# Patient Record
Sex: MALE | Race: WHITE | NOT HISPANIC OR LATINO | URBAN - METROPOLITAN AREA
[De-identification: names, ages, dates, MRNs, and addresses within clinical notes are randomized per-mention and may not be internally consistent; named-entity substitution may affect disease eponyms.]

---

## 2018-12-10 ENCOUNTER — HOSPITAL ENCOUNTER (OUTPATIENT)
Facility: HOSPITAL | Age: 53
Setting detail: OBSERVATION
Discharge: HOME | End: 2018-12-11
Attending: EMERGENCY MEDICINE | Admitting: HOSPITALIST
Payer: COMMERCIAL

## 2018-12-10 ENCOUNTER — APPOINTMENT (EMERGENCY)
Dept: RADIOLOGY | Facility: HOSPITAL | Age: 53
End: 2018-12-10
Payer: COMMERCIAL

## 2018-12-10 DIAGNOSIS — R06.09 DYSPNEA ON EXERTION: ICD-10-CM

## 2018-12-10 DIAGNOSIS — I25.10 CORONARY ARTERY DISEASE INVOLVING NATIVE CORONARY ARTERY OF NATIVE HEART, ANGINA PRESENCE UNSPECIFIED: ICD-10-CM

## 2018-12-10 DIAGNOSIS — R06.02 SHORTNESS OF BREATH: Primary | ICD-10-CM

## 2018-12-10 DIAGNOSIS — I20.0 UNSTABLE ANGINA (CMS/HCC): ICD-10-CM

## 2018-12-10 PROBLEM — F25.9 SCHIZOAFFECTIVE DISORDER (CMS/HCC): Status: ACTIVE | Noted: 2018-12-10

## 2018-12-10 PROBLEM — R07.9 CHEST PAIN: Status: ACTIVE | Noted: 2018-12-10

## 2018-12-10 PROBLEM — E78.5 HYPERLIPIDEMIA: Status: ACTIVE | Noted: 2018-12-10

## 2018-12-10 PROBLEM — G40.909 SEIZURE DISORDER (CMS/HCC): Status: ACTIVE | Noted: 2018-12-10

## 2018-12-10 PROBLEM — R06.00 DYSPNEA: Status: ACTIVE | Noted: 2018-12-10

## 2018-12-10 PROBLEM — I10 BENIGN HYPERTENSION: Status: ACTIVE | Noted: 2018-12-10

## 2018-12-10 LAB
ANION GAP SERPL CALC-SCNC: 8 MEQ/L (ref 3–15)
ATRIAL RATE: 88
BASOPHILS # BLD: 0.02 K/UL (ref 0.01–0.1)
BASOPHILS NFR BLD: 0.3 %
BUN SERPL-MCNC: 14 MG/DL (ref 8–20)
CALCIUM SERPL-MCNC: 8.9 MG/DL (ref 8.9–10.3)
CHLORIDE SERPL-SCNC: 107 MEQ/L (ref 98–109)
CO2 SERPL-SCNC: 24 MEQ/L (ref 22–32)
CREAT SERPL-MCNC: 0.8 MG/DL
D DIMER PPP IA.FEU-MCNC: 0.33 UG/ML FEU (ref 0–0.5)
DIFFERENTIAL METHOD BLD: ABNORMAL
EOSINOPHIL # BLD: 0 K/UL (ref 0.04–0.54)
EOSINOPHIL NFR BLD: 0 %
ERYTHROCYTE [DISTWIDTH] IN BLOOD BY AUTOMATED COUNT: 12.3 % (ref 11.6–14.4)
GFR SERPL CREATININE-BSD FRML MDRD: >60 ML/MIN/1.73M*2
GLUCOSE SERPL-MCNC: 103 MG/DL (ref 70–99)
HCT VFR BLDCO AUTO: 47.9 %
HGB BLD-MCNC: 15.9 G/DL
IMM GRANULOCYTES # BLD AUTO: 0.02 K/UL (ref 0–0.08)
IMM GRANULOCYTES NFR BLD AUTO: 0.3 %
LYMPHOCYTES # BLD: 2.49 K/UL (ref 1.2–3.5)
LYMPHOCYTES NFR BLD: 36.4 %
MCH RBC QN AUTO: 28.9 PG (ref 28–33.2)
MCHC RBC AUTO-ENTMCNC: 33.2 G/DL (ref 32.2–36.5)
MCV RBC AUTO: 86.9 FL (ref 83–98)
MONOCYTES # BLD: 0.47 K/UL (ref 0.3–1)
MONOCYTES NFR BLD: 6.9 %
NEUTROPHILS # BLD: 3.84 K/UL (ref 1.7–7)
NEUTS SEG NFR BLD: 56.1 %
NRBC BLD-RTO: 0 %
P AXIS: 35
PDW BLD AUTO: 9.5 FL (ref 9.4–12.4)
PLATELET # BLD AUTO: 223 K/UL
POTASSIUM SERPL-SCNC: 4 MEQ/L (ref 3.6–5.1)
PR INTERVAL: 154
QRS DURATION: 98
QT INTERVAL: 354
QTC CALCULATION(BAZETT): 428
R AXIS: 1
RBC # BLD AUTO: 5.51 M/UL (ref 4.5–5.8)
SODIUM SERPL-SCNC: 139 MEQ/L (ref 136–144)
T WAVE AXIS: 9
TROPONIN I SERPL-MCNC: <0.03 NG/ML
TROPONIN I SERPL-MCNC: <0.03 NG/ML
VENTRICULAR RATE: 88
WBC # BLD AUTO: 6.84 K/UL

## 2018-12-10 PROCEDURE — 99285 EMERGENCY DEPT VISIT HI MDM: CPT | Mod: 25

## 2018-12-10 PROCEDURE — 99220 PR INITIAL OBSERVATION CARE/DAY 70 MINUTES: CPT | Performed by: HOSPITALIST

## 2018-12-10 PROCEDURE — 80048 BASIC METABOLIC PNL TOTAL CA: CPT | Performed by: EMERGENCY MEDICINE

## 2018-12-10 PROCEDURE — 84484 ASSAY OF TROPONIN QUANT: CPT

## 2018-12-10 PROCEDURE — 84484 ASSAY OF TROPONIN QUANT: CPT | Mod: 91 | Performed by: HOSPITALIST

## 2018-12-10 PROCEDURE — G0378 HOSPITAL OBSERVATION PER HR: HCPCS

## 2018-12-10 PROCEDURE — 85379 FIBRIN DEGRADATION QUANT: CPT | Performed by: HOSPITALIST

## 2018-12-10 PROCEDURE — 36415 COLL VENOUS BLD VENIPUNCTURE: CPT

## 2018-12-10 PROCEDURE — 80048 BASIC METABOLIC PNL TOTAL CA: CPT

## 2018-12-10 PROCEDURE — 93005 ELECTROCARDIOGRAM TRACING: CPT | Performed by: HOSPITALIST

## 2018-12-10 PROCEDURE — 93005 ELECTROCARDIOGRAM TRACING: CPT | Performed by: EMERGENCY MEDICINE

## 2018-12-10 PROCEDURE — 85025 COMPLETE CBC W/AUTO DIFF WBC: CPT

## 2018-12-10 PROCEDURE — 63700000 HC SELF-ADMINISTRABLE DRUG: Performed by: HOSPITALIST

## 2018-12-10 PROCEDURE — 93005 ELECTROCARDIOGRAM TRACING: CPT

## 2018-12-10 PROCEDURE — 84484 ASSAY OF TROPONIN QUANT: CPT | Performed by: EMERGENCY MEDICINE

## 2018-12-10 PROCEDURE — 85025 COMPLETE CBC W/AUTO DIFF WBC: CPT | Performed by: EMERGENCY MEDICINE

## 2018-12-10 PROCEDURE — 71046 X-RAY EXAM CHEST 2 VIEWS: CPT

## 2018-12-10 RX ORDER — DEXTROSE 40 %
15-30 GEL (GRAM) ORAL AS NEEDED
Status: DISCONTINUED | OUTPATIENT
Start: 2018-12-10 | End: 2018-12-11 | Stop reason: HOSPADM

## 2018-12-10 RX ORDER — ROSUVASTATIN CALCIUM 10 MG/1
10 TABLET, COATED ORAL DAILY
Status: DISCONTINUED | OUTPATIENT
Start: 2018-12-10 | End: 2018-12-11 | Stop reason: HOSPADM

## 2018-12-10 RX ORDER — OMEPRAZOLE 20 MG/1
20 CAPSULE, DELAYED RELEASE ORAL DAILY
COMMUNITY

## 2018-12-10 RX ORDER — ASPIRIN 81 MG/1
81 TABLET ORAL DAILY
COMMUNITY

## 2018-12-10 RX ORDER — ASPIRIN 325 MG/1
81 TABLET, FILM COATED ORAL DAILY
COMMUNITY
End: 2018-12-10 | Stop reason: ENTERED-IN-ERROR

## 2018-12-10 RX ORDER — LAMOTRIGINE 200 MG/1
TABLET ORAL
Refills: 0 | COMMUNITY
Start: 2018-10-23

## 2018-12-10 RX ORDER — CLONAZEPAM 1 MG/1
TABLET ORAL
Refills: 2 | COMMUNITY
Start: 2018-11-29

## 2018-12-10 RX ORDER — IBUPROFEN 200 MG
16-32 TABLET ORAL AS NEEDED
Status: DISCONTINUED | OUTPATIENT
Start: 2018-12-10 | End: 2018-12-11 | Stop reason: HOSPADM

## 2018-12-10 RX ORDER — DEXTROSE 50 % IN WATER (D50W) INTRAVENOUS SYRINGE
25 AS NEEDED
Status: DISCONTINUED | OUTPATIENT
Start: 2018-12-10 | End: 2018-12-11 | Stop reason: HOSPADM

## 2018-12-10 RX ORDER — METOPROLOL TARTRATE 25 MG/1
25 TABLET, FILM COATED ORAL 2 TIMES DAILY
COMMUNITY
End: 2018-12-10 | Stop reason: ENTERED-IN-ERROR

## 2018-12-10 RX ORDER — CLOZAPINE 200 MG/1
TABLET ORAL
Refills: 11 | COMMUNITY
Start: 2018-11-30

## 2018-12-10 RX ORDER — ASPIRIN 81 MG/1
81 TABLET ORAL DAILY
Status: DISCONTINUED | OUTPATIENT
Start: 2018-12-10 | End: 2018-12-11 | Stop reason: HOSPADM

## 2018-12-10 RX ORDER — POTASSIUM CHLORIDE 14.9 MG/ML
20 INJECTION INTRAVENOUS AS NEEDED
Status: DISCONTINUED | OUTPATIENT
Start: 2018-12-10 | End: 2018-12-11 | Stop reason: HOSPADM

## 2018-12-10 RX ORDER — CLONAZEPAM 1 MG/1
1 TABLET ORAL 2 TIMES DAILY PRN
Status: DISCONTINUED | OUTPATIENT
Start: 2018-12-10 | End: 2018-12-11 | Stop reason: HOSPADM

## 2018-12-10 RX ORDER — ACETAMINOPHEN 325 MG/1
650 TABLET ORAL EVERY 6 HOURS PRN
Status: DISCONTINUED | OUTPATIENT
Start: 2018-12-10 | End: 2018-12-11 | Stop reason: HOSPADM

## 2018-12-10 RX ORDER — LAMOTRIGINE 200 MG/1
200 TABLET ORAL 2 TIMES DAILY
Status: DISCONTINUED | OUTPATIENT
Start: 2018-12-10 | End: 2018-12-11 | Stop reason: HOSPADM

## 2018-12-10 RX ORDER — AMLODIPINE BESYLATE 2.5 MG/1
TABLET ORAL
COMMUNITY
Start: 2018-09-17

## 2018-12-10 RX ORDER — ROSUVASTATIN CALCIUM 10 MG/1
10 TABLET, COATED ORAL DAILY
COMMUNITY

## 2018-12-10 RX ORDER — CLOZAPINE 100 MG/1
200 TABLET ORAL 2 TIMES DAILY
Status: DISCONTINUED | OUTPATIENT
Start: 2018-12-10 | End: 2018-12-11 | Stop reason: HOSPADM

## 2018-12-10 RX ORDER — METOPROLOL SUCCINATE 25 MG/1
25 TABLET, EXTENDED RELEASE ORAL EVERY EVENING
COMMUNITY

## 2018-12-10 RX ORDER — METOPROLOL SUCCINATE 25 MG/1
25 TABLET, EXTENDED RELEASE ORAL EVERY EVENING
Status: DISCONTINUED | OUTPATIENT
Start: 2018-12-10 | End: 2018-12-11 | Stop reason: HOSPADM

## 2018-12-10 RX ORDER — AMLODIPINE BESYLATE 2.5 MG/1
2.5 TABLET ORAL DAILY
Status: DISCONTINUED | OUTPATIENT
Start: 2018-12-10 | End: 2018-12-11 | Stop reason: HOSPADM

## 2018-12-10 RX ORDER — PANTOPRAZOLE SODIUM 40 MG/1
40 TABLET, DELAYED RELEASE ORAL DAILY
Status: DISCONTINUED | OUTPATIENT
Start: 2018-12-10 | End: 2018-12-11 | Stop reason: HOSPADM

## 2018-12-10 RX ADMIN — LAMOTRIGINE 200 MG: 200 TABLET ORAL at 22:06

## 2018-12-10 RX ADMIN — METOPROLOL SUCCINATE 25 MG: 25 TABLET, EXTENDED RELEASE ORAL at 22:05

## 2018-12-10 RX ADMIN — TICAGRELOR 90 MG: 90 TABLET ORAL at 22:06

## 2018-12-10 RX ADMIN — PANTOPRAZOLE SODIUM 40 MG: 40 TABLET, DELAYED RELEASE ORAL at 22:05

## 2018-12-10 RX ADMIN — ACETAMINOPHEN 650 MG: 325 TABLET ORAL at 22:06

## 2018-12-10 RX ADMIN — ASPIRIN 81 MG: 81 TABLET, COATED ORAL at 22:05

## 2018-12-10 RX ADMIN — CLOZAPINE 200 MG: 100 TABLET ORAL at 22:06

## 2018-12-10 RX ADMIN — AMLODIPINE BESYLATE 2.5 MG: 2.5 TABLET ORAL at 22:06

## 2018-12-10 RX ADMIN — ROSUVASTATIN CALCIUM 10 MG: 10 TABLET, FILM COATED ORAL at 22:05

## 2018-12-10 ASSESSMENT — COGNITIVE AND FUNCTIONAL STATUS - GENERAL
STANDING UP FROM CHAIR USING ARMS: 4 - NONE
HELP NEEDED FOR BATHING: 4 - NONE
DRESSING REGULAR UPPER BODY CLOTHING: 4 - NONE
EATING MEALS: 4 - NONE
DRESSING REGULAR LOWER BODY CLOTHING: 4 - NONE
WALKING IN HOSPITAL ROOM: 4 - NONE
CLIMB 3 TO 5 STEPS WITH RAILING: 4 - NONE
TOILETING: 4 - NONE
MOVING TO AND FROM BED TO CHAIR: 4 - NONE
HELP NEEDED FOR PERSONAL GROOMING: 4 - NONE

## 2018-12-10 ASSESSMENT — ENCOUNTER SYMPTOMS
ABDOMINAL PAIN: 0
DIAPHORESIS: 0
TREMORS: 1
SYNCOPE: 0
NAUSEA: 0
WEAKNESS: 0
CHILLS: 0
HEADACHES: 0
VOMITING: 1
CHEST TIGHTNESS: 0
WHEEZING: 1
FEVER: 0
DIARRHEA: 0
SHORTNESS OF BREATH: 1

## 2018-12-10 NOTE — ED PROVIDER NOTES
HPI     Chief Complaint   Patient presents with   • Shortness of Breath       54y/o M PMHx of HTN, hyperlipidemia, GERD, depression, anxiety, schizoaffective d/o and past cardiac cath with 1 stent 8/25/17, states that he has been experiencing increasing SOB x2weeks. The patient states that he has been having associated wheezing upon exertion and associated chest pain. The patient states that his SOB improves with sitting down and is worse when taking a deep breath. The patient also states that the chest pain radiates to his left scapula but is not similar to his past chest pain experiences. Patient states that he did feel as if he were having tremors when he was experiencing his SOB and also states that he has been having increasing anxiety because of his job which he hates. Patient also states that he has several glasses of wine per night but denies any other drug use or smoking. He denies taking any medications for his SOB or chest pain.  Patient denies any N/V/D, fever, HA, chest palpitations, dizziness, lightheadedness, sweating, presyncope or syncope.         Shortness of Breath   Severity:  Moderate  Onset quality:  Gradual  Duration:  2 weeks  Timing:  Intermittent  Progression:  Worsening  Chronicity:  Recurrent  Context: activity    Relieved by:  Rest  Worsened by:  Deep breathing, exertion, activity, stress and emotional stress  Ineffective treatments:  None tried  Associated symptoms: chest pain, vomiting (States he vomited x1 several days ago due to eating hot wings) and wheezing    Associated symptoms: no abdominal pain, no diaphoresis, no fever, no headaches and no syncope         Patient History     Past Medical History:   Diagnosis Date   • Depression    • GERD (gastroesophageal reflux disease)    • Hypertension    • Lipid disorder        History reviewed. No pertinent surgical history.    History reviewed. No pertinent family history.    Social History   Substance Use Topics   • Smoking status:  "Former Smoker     Quit date: 12/3/2009   • Smokeless tobacco: Never Used   • Alcohol use No       Systems Reviewed from Nursing Triage:          Review of Systems     Review of Systems   Constitutional: Negative for chills, diaphoresis and fever.   HENT: Negative for congestion.    Respiratory: Positive for shortness of breath and wheezing. Negative for chest tightness.    Cardiovascular: Positive for chest pain. Negative for syncope.   Gastrointestinal: Positive for vomiting (States he vomited x1 several days ago due to eating hot wings). Negative for abdominal pain, diarrhea and nausea.   Neurological: Positive for tremors. Negative for syncope, weakness and headaches.        Physical Exam     ED Triage Vitals [12/10/18 1430]   Temp Heart Rate Resp BP SpO2   36.3 °C (97.3 °F) 91 16 (!) 160/95 96 %      Temp Source Heart Rate Source Patient Position BP Location FiO2 (%) (Set)   Oral Monitor Sitting -- --                     Patient Vitals for the past 24 hrs:   BP Temp Temp src Pulse Resp SpO2 Height Weight   12/10/18 1430 (!) 160/95 36.3 °C (97.3 °F) Oral 91 16 96 % 1.651 m (5' 5\") 86.2 kg (190 lb)           Physical Exam   Constitutional: He is oriented to person, place, and time. He appears well-developed and well-nourished.   HENT:   Head: Normocephalic and atraumatic.   Eyes: Conjunctivae and EOM are normal. Pupils are equal, round, and reactive to light.   Neck: Normal range of motion. Neck supple.   Cardiovascular: Normal rate, regular rhythm, S1 normal, S2 normal, normal heart sounds and intact distal pulses.    No lifts, thrills or heaves appreciated.    Pulmonary/Chest: Effort normal and breath sounds normal. He has no wheezes. He has no rales. He exhibits no tenderness.   Lungs are clear bilaterally with no wheezes, rhonchi, or rales. Chest pain is non-reproducible.    Abdominal: Soft. Bowel sounds are normal. He exhibits no distension and no mass. There is no tenderness. There is no guarding. "   Neurological: He is alert and oriented to person, place, and time.   Skin: Skin is warm and dry. No erythema.   Nursing note and vitals reviewed.           Procedures    ED Course & MDM     Labs Reviewed   CBC AND DIFFERENTIAL    Narrative:     The following orders were created for panel order CBC and differential.  Procedure                               Abnormality         Status                     ---------                               -----------         ------                     CBC[[id number]]                                                                          Diff Count[[id number]]                                                                     Please view results for these tests on the individual orders.   BASIC METABOLIC PANEL   TROPONIN I   CBC   DIFF COUNT       X-RAY CHEST 2 VIEWS   Final Result   IMPRESSION: No active disease in the chest.      COMMENT: The current two view examination of the chest was compared to that   dated 9/7/2017.      The lungs are free of active infiltration and congestion.  No volume loss or   pleural effusions are seen.  The cardiomediastinal silhouette is within normal   limits of size and configuration.      The bony thorax is intact.            ECG 12 lead    (Results Pending)               [facility]         ED Course as of Dec 10 1729   Mon Dec 10, 2018   1521 CXR reviewed, no active dz in the chest  [GM]   1537 Peak flow performed =450  [GM]   1640 HEART Score for Major Cardiac Events from [email or web address]  on 12/10/2018  ** All calculations should be rechecked by clinician prior to use **    RESULT SUMMARY:  4 points  Moderate Score (4-6 points)    Risk of MACE of 12-16.6%.      INPUTS:  History --> 1 = Moderately suspicious  EKG --> 0 = Normal  Age --> 1 = 45-64  Risk factors --> 2 = =3 risk factors or history of atherosclerotic disease  Initial troponin --> 0 = =normal limit    [GM]   1650 DW Dr Lema  for hospitalization  [DP]   1655 Discussed course of action with  patient, he agrees   [GM]      ED Course User Index  [DP] Sheldon Reilly MD  [GM] Ayden Velásquez PA C         Clinical Impressions as of Dec 10 1729   Shortness of breath   Unstable angina (CMS/[facility]) ([facility]) - r/o        Ayden Velásquez PA C  12/10/18 2584

## 2018-12-10 NOTE — H&P
"   Hospital Medicine Service -  History & Physical        CHIEF COMPLAINT   Dyspnea     HISTORY OF PRESENT ILLNESS      Jhon Schroeder is a 53 y.o. male with a past medical history of CAD s/p stent to posterolateral branch of RCA in 8/17 (with patent stent on [facility] 9/17), HTN, HLD, seizures, schizoaffective disorder who presents with dyspnea and chest pain.  He states that for the past two weeks, he's noted dyspnea.  Feels like he is SOB to where he has to work to breathe.  He notes \"heavy wheezing\" with some of these episodes.  They occur with exertion (almost always when he walks up the steps) but also can occur while he's lying in bed at night.  He notices some chest pressure associated with the dyspnea on occasion.  Some chest \"constriction\" when taking a deep breath as well.  No orthopnea or PND.  No LE edema.  No cough or fever.  Had URI a few weeks ago.  He went to see his primary care physician today who told him his lungs sounded clear and advised him to come to the ER.  He has not missed doses of his DAPT and is compliant with his other medications.    Prior cardiologic testing:   Exercise nuclear stress 2014, did not achieve target HR, no ischemic findings.   [facility] 8/17:  RPL BRYCE placed   [facility] 9/17:  Patent RPL stent, possible microvascular disease with mild slow flow in RCA.   TTE 8/17:  EF 60-65% without RWMA.      PAST MEDICAL AND SURGICAL HISTORY      Past Medical History:   Diagnosis Date   • CAD (coronary artery disease)    • Depression    • GERD (gastroesophageal reflux disease)    • Hypertension    • Lipid disorder    • Schizoaffective disorder (CMS/HCC) ([facility])    • Seizures (CMS/HCC) ([facility])        History reviewed. No pertinent surgical history.    PCP: Madelaine Leahy MD    MEDICATIONS      Prior to Admission medications    Medication Sig Start Date End Date Taking? Authorizing Provider   amLODIPine (NORVASC) 2.5 mg tablet  9/17/18  Yes Provider, MD Pau   aspirin 81 mg enteric coated tablet " Take 81 mg by mouth daily.   Yes Pau Banuelos MD   metoprolol succinate XL (TOPROL-XL) 25 mg 24 hr tablet Take 25 mg by mouth every evening.   Yes Pau Banuelos MD   ticagrelor (BRILINTA) 90 mg tablet  9/24/18  Yes Pau Banuelos MD   clonazePAM (klonoPIN) 1 mg tablet TK 1 T PO BID PRN 11/29/18   Pau Banuelos MD   clozapine (CLOZARIL) 200 mg tablet TK 1 T PO BID 11/30/18   Pau Banuelos MD   lamoTRIgine (LaMICtal) 200 mg tablet TK 1 T PO BID 10/23/18   Pau Banuelos MD   omeprazole (PriLOSEC) 20 mg capsule Take 20 mg by mouth daily.    Pau Banuelos MD   rosuvastatin (CRESTOR) 10 mg tablet Take 10 mg by mouth daily.    Pau Banuelos MD   buffered aspirin (BUFFERIN) 325 mg tablet Take 81 mg by mouth daily.  12/10/18  Pau Banuelos MD   metoprolol tartrate (LOPRESSOR) 25 mg tablet Take 25 mg by mouth 2 times daily.  12/10/18  Pau Banuelos MD       ALLERGIES      No known allergies    FAMILY HISTORY      Family History   Problem Relation Age of Onset   • Heart disease Father        SOCIAL HISTORY      Social History     Social History   • Marital status: Single     Spouse name: N/A   • Number of children: N/A   • Years of education: N/A     Social History Main Topics   • Smoking status: Former Smoker     Packs/day: 0.50     Years: 30.00     Types: Cigarettes     Quit date: 12/3/2009   • Smokeless tobacco: Never Used   • Alcohol use 0.6 - 1.2 oz/week     1 - 2 Glasses of wine per week      Comment: Nightly   • Drug use: No   • Sexual activity: Not Asked     Other Topics Concern   • None     Social History Narrative    Lives at home alone in [place].  Works as an .  No children.       REVIEW OF SYSTEMS      All other systems reviewed and negative except as noted in HPI    PHYSICAL EXAMINATION      Temp:  [36.3 °C (97.3 °F)] 36.3 °C (97.3 °F)  Heart Rate:  [84-96] 96  Resp:  [16-22] 22  BP: (124-160)/(84-95) 124/84  Body mass  "index is 31.62 kg/m².    Physical Exam   Constitutional: He is oriented to person, place, and time. He appears well-developed and well-nourished.   HENT:   Head: Normocephalic and atraumatic.   Eyes: EOM are normal. Pupils are equal, round, and reactive to light.   Neck: Normal range of motion. Neck supple.   Cardiovascular: Normal rate, regular rhythm, normal heart sounds and intact distal pulses.  Exam reveals no gallop and no friction rub.    No murmur heard.  Pulmonary/Chest: Effort normal and breath sounds normal. No respiratory distress. He has no wheezes. He has no rales. He exhibits no tenderness.   Abdominal: Soft. Bowel sounds are normal. He exhibits no distension. There is no tenderness. There is no guarding.   Musculoskeletal: Normal range of motion. He exhibits no edema, tenderness or deformity.   Neurological: He is alert and oriented to person, place, and time.   Skin: Skin is warm and dry. Capillary refill takes less than 2 seconds.   Psychiatric: He has a normal mood and affect.       LABS / IMAGING / EKG        Labs  Troponin negative at 3:30pm  Bicarb 20    Imaging  CXR personally reviewed, no acute disease    ECG/Telemetry  EKG:  NSR.  T wave inversions leads III, AVF (stable from prior).  QTC 428ms.    ASSESSMENT AND PLAN           * Dyspnea   Assessment & Plan    Patient having vague symptoms of SOB and \"heavy wheezing\" occurring at rest but also with exertion, seems to be exacerbated by physical activity.  Also with associated chest tightness and some pleuritic chest pain as well.  -Despite two weeks of symptoms, troponin and EKG are non ischemic.  Lungs are clear on exam and CXR is unremarkable.    -Trend second troponin  -Check d dimer-has no formal risk factors for PE however difficult to explain symptoms and is 94-95% on room air.    -Will keep NPO at MN and ask cardiology to eval in a.m. If above workup is negative for possible ischemic evaluation.  -Discussed plan with patient and he is " agreeable        CAD (coronary artery disease), native coronary artery   Assessment & Plan    As above.  -Continue Brilinta and ASA, Metoprolol, Crestor.  Check FLP.        Schizoaffective disorder (CMS/[facility]) ([facility])   Assessment & Plan    Stable.  -Cont. Clozapine and PRN Klonopin        Seizure disorder (CMS/[facility]) ([facility])   Assessment & Plan    Stable, no issues.  -Cont. Lamictal        Hyperlipidemia   Assessment & Plan    Check FLP.  Continue Crestor.        Benign hypertension   Assessment & Plan    BP stable.  -Continue Amlodipine and Metoprolol succinate             VTE Assessment: [place] VTE Score: 0  VTE Prophylaxis Plan: Ambulation   Code Status: Full Code  Estimated Discharge Date: 12/11/2018  Disposition Planning: Pending evaluation as above, anticipate likely home tomorrow.     Jessica Lema,   12/10/2018

## 2018-12-10 NOTE — ASSESSMENT & PLAN NOTE
"Patient having vague symptoms of SOB and \"heavy wheezing\" occurring at rest but also with exertion, seems to be exacerbated by physical activity.  Also with associated chest tightness and some pleuritic chest pain as well.  -Despite two weeks of symptoms, troponin and EKG are non ischemic.  Lungs are clear on exam and CXR is unremarkable.    - troponin negative x 2  -Negative d-dimer  - stress echo completed 12/11, unable to reach THR .  Discussed with cardiology.  Echo portion showed NO RWMA.  He will need follow-up with Dr.Tarun Briseno as OP for continued follow up  "

## 2018-12-10 NOTE — ED ATTESTATION NOTE
I have personally seen and examined the patient.  I reviewed and agree with the PA/NP's assessment and plan of care, with the following exceptions: None     My examination, assessment, and plan of care of Jhon Schroeder is as follows:    Patient 87-year-old history of coronary disease he states the last 2 weeks he has been getting shortness of breath when he overexerts himself.  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  He has to stop working because of dyspnea.  He states he gets some mild chest tightness with this.  No recent travel.  No leg pain leg swelling.  No fever or cough.  On exam his lungs are clear.  Regular rate and rhythm.  No edema or calf tenderness.  Pulses are good.  EKG reviewed  Normal sinus rhythm 88  Normal intervals  Nonspecific ST changes  T wave inversion in lead III and F similar to prior  No STEMI  Symptoms concerning for unstable angina.  He took his aspirin and Brilinta today.  Awaiting lab work.  If troponin negative will hospitalize for further cardiac eval.  If he rules in will hospitalize for ACS.     I was physically present for the key/critical portions of the following procedures: None       Sheldon Reilly MD  12/10/18 8924

## 2018-12-11 ENCOUNTER — APPOINTMENT (OUTPATIENT)
Dept: CARDIOLOGY | Facility: HOSPITAL | Age: 53
Setting detail: OBSERVATION
End: 2018-12-11
Payer: COMMERCIAL

## 2018-12-11 VITALS
BODY MASS INDEX: 31.99 KG/M2 | HEIGHT: 65 IN | SYSTOLIC BLOOD PRESSURE: 124 MMHG | OXYGEN SATURATION: 94 % | WEIGHT: 192 LBS | RESPIRATION RATE: 18 BRPM | TEMPERATURE: 98.2 F | HEART RATE: 85 BPM | DIASTOLIC BLOOD PRESSURE: 74 MMHG

## 2018-12-11 LAB
AORTIC VALVE MEAN VELOCITY: 0.88 M/S
AORTIC VALVE VELOCITY TIME INTEGRAL: 21.1 CM
ATRIAL RATE: 84
AV MEAN GRADIENT: 4 MMHG
AV PEAK GRADIENT: 5 MMHG
AV PEAK VELOCITY-S: 1.17 M/S
AV VALVE AREA: 3.02 CM2
AV VALVE AREA: 3.31 CM2
BSA FOR ECHO PROCEDURE: 2 M2
CHOLEST SERPL-MCNC: 159 MG/DL
DOP CALC LVOT STROKE VOLUME: 69.93 ML
E WAVE DECELERATION TIME: 222 MS
E/A RATIO: 0.9
E/E' RATIO: 7.9
E/LAT E' RATIO: 6
EDV (BP): 76 CM3
EF (A4C): 63 %
EF A2C: 53 %
EJECTION FRACTION: 58 %
ESV (BP): 32 CM3
GLUCOSE BLD-MCNC: 154 MG/DL (ref 70–99)
HDLC SERPL-MCNC: 48 MG/DL
HDLC SERPL: 3.3 {RATIO}
LA ESV (BP): 54 CM3
LA ESV INDEX (A2C): 24.5 CM3/M2
LA ESV INDEX (BP): 27 CM3/M2
LAAS-AP2: 18 CM2
LAAS-AP4: 19.9 CM2
LALD A4C: 5.35 CM
LALD A4C: 5.84 CM
LAV-S: 49 CM3
LDLC SERPL CALC-MCNC: 65 MG/DL
LEFT ATRIUM VOLUME INDEX: 27.5 CM3/M2
LEFT ATRIUM VOLUME: 55 CM3
LEFT VENTRICLE DIASTOLIC VOLUME INDEX: 35 CM3/M2
LEFT VENTRICLE DIASTOLIC VOLUME: 70 CM3
LEFT VENTRICLE SYSTOLIC VOLUME INDEX: 13 CM3/M2
LEFT VENTRICLE SYSTOLIC VOLUME: 26 CM3
LV DIASTOLIC VOLUME: 79 CM3
LV ESV (APICAL 2 CHAMBER): 37 CM3
LVAD-AP2: 25.9 CM2
LVAD-AP4: 25.1 CM2
LVAS-AP2: 15.9 CM2
LVAS-AP4: 13.6 CM2
LVEDVI(A2C): 39.5 CM3/M2
LVEDVI(BP): 38 CM3/M2
LVESVI(A2C): 18.5 CM3/M2
LVESVI(BP): 16 CM3/M2
LVLD-AP2: 7.09 CM
LVLD-AP4: 7.42 CM
LVLS-AP2: 5.76 CM
LVLS-AP4: 5.88 CM
LVOT 2D: 2.1 CM
LVOT A: 3.46 CM2
LVOT MG: 3 MMHG
LVOT MV: 0.77 M/S
LVOT PEAK VELOCITY: 1.02 M/S
LVOT PG: 4 MMHG
LVOT VTI: 20.2 CM
MV E'TISSUE VEL-LAT: 0.08 M/S
MV E'TISSUE VEL-MED: 0.06 M/S
MV PEAK A VEL: 0.51 M/S
MV PEAK E VEL: 0.47 M/S
NONHDLC SERPL-MCNC: 111 MG/DL
P AXIS: 34
POCT TEST: ABNORMAL
PR INTERVAL: 154
PULM VEIN S/D RATIO: 1.4
PV PEAK D VEL: 0.4 M/S
PV PEAK S VEL: 0.57 M/S
QRS DURATION: 102
QT INTERVAL: 380
QTC CALCULATION(BAZETT): 449
R AXIS: 23
STRESS ANGINA INDEX: 1
STRESS BASELINE BP: NORMAL MMHG
STRESS BASELINE HR: 80 BPM
STRESS ECHO POST RECOVERY HR: 105 BPM
STRESS PERCENT HR: 69 %
STRESS POST ESTIMATED WORKLOAD: 7 METS
STRESS POST EXERCISE DUR MIN: 6 MIN
STRESS POST EXERCISE DUR SEC: 1 SEC
STRESS POST PEAK BP: NORMAL MMHG
STRESS POST PEAK HR: 114 BPM
STRESS TARGET HR: 141 BPM
T WAVE AXIS: 1
TR MAX PG: 11 MMHG
TRICUSPID VALVE PEAK REGURGITATION VELOCITY: 1.69 M/S
TRIGL SERPL-MCNC: 232 MG/DL (ref 30–149)
TV PEAK SYSTOLIC PULMONARY ARTERY PRESSURE: 11 MMHG
TV REST PULMONARY ARTERY PRESSURE: 11 MMHG
VENTRICULAR RATE: 84

## 2018-12-11 PROCEDURE — 99217 PR OBSERVATION CARE DISCHARGE MANAGEMENT: CPT | Performed by: INTERNAL MEDICINE

## 2018-12-11 PROCEDURE — 63700000 HC SELF-ADMINISTRABLE DRUG: Performed by: HOSPITALIST

## 2018-12-11 PROCEDURE — 93351 STRESS TTE COMPLETE: CPT | Mod: 26 | Performed by: INTERNAL MEDICINE

## 2018-12-11 PROCEDURE — G0378 HOSPITAL OBSERVATION PER HR: HCPCS

## 2018-12-11 PROCEDURE — 36415 COLL VENOUS BLD VENIPUNCTURE: CPT | Performed by: HOSPITALIST

## 2018-12-11 PROCEDURE — 80061 LIPID PANEL: CPT | Performed by: HOSPITALIST

## 2018-12-11 PROCEDURE — 4A02XM4 MEASUREMENT OF CARDIAC TOTAL ACTIVITY, EXTERNAL APPROACH: ICD-10-PCS | Performed by: INTERNAL MEDICINE

## 2018-12-11 PROCEDURE — 93010 ELECTROCARDIOGRAM REPORT: CPT | Performed by: INTERNAL MEDICINE

## 2018-12-11 PROCEDURE — 93351 STRESS TTE COMPLETE: CPT

## 2018-12-11 RX ADMIN — AMLODIPINE BESYLATE 2.5 MG: 2.5 TABLET ORAL at 09:36

## 2018-12-11 RX ADMIN — PANTOPRAZOLE SODIUM 40 MG: 40 TABLET, DELAYED RELEASE ORAL at 09:36

## 2018-12-11 RX ADMIN — CLONAZEPAM 1 MG: 1 TABLET ORAL at 15:18

## 2018-12-11 RX ADMIN — ASPIRIN 81 MG: 81 TABLET, COATED ORAL at 09:34

## 2018-12-11 RX ADMIN — TICAGRELOR 90 MG: 90 TABLET ORAL at 09:33

## 2018-12-11 RX ADMIN — CLOZAPINE 200 MG: 100 TABLET ORAL at 09:36

## 2018-12-11 RX ADMIN — ACETAMINOPHEN 650 MG: 325 TABLET ORAL at 12:39

## 2018-12-11 RX ADMIN — LAMOTRIGINE 200 MG: 200 TABLET ORAL at 09:34

## 2018-12-11 NOTE — NURSING NOTE
Pt 229W,Elda. Has complaints of intermittent CP. Worse than before admission. States pressure. Had to draw out info from him. EKG was done about 30 min ago along w Dimer and trop. [place]shine PULIDO [phone] paged [phone] [address]

## 2018-12-11 NOTE — PLAN OF CARE
Problem: Patient Care Overview  Goal: Plan of Care Review  Outcome: Ongoing (interventions implemented as appropriate)  Pt will remain updated on care t/o shift

## 2018-12-11 NOTE — PROGRESS NOTES
I re-evaluated patient at approximately 10:30.  He states he had a brief episode of SOB that resolved on its own.  He feels he may have a sinus infection and feels that a stuffy nose could be playing a role.  He also states he's had sharp intermittent chest pain, also resolved.  Resting comfortably at time of exam.    Repeat troponin negative.  D dimer negative.  Vitals stable.  Repeat EKG unchanged.  Continue to monitor on telemetry, monitor symptoms overnight.  Tylenol ordered for pain as needed.  Workup negative thus far.  Discussed with pt's RN.    Jessica Lema DO

## 2018-12-11 NOTE — NURSING NOTE
"Brief pre-procedure assessment: Lungs clear, heart rate regular. Reports chest pain, \"constrictive\" non-radiating left chest wall, not relieved by position change during Echo. Cardiology fellow Jose Enrique Bhagat notified and reviewed patients EKG and preliminary echo, spoke with patient and OK'd continuing with study.   "

## 2018-12-11 NOTE — NURSING NOTE
Pt accepted from ER via wheelchair, A&Ox3, verbalizing dyspnea with ambulation in room.  Describes SOB similar to that which brought him to the ER.  Telemetry monitor placed and confirmed with the MR.  Monitor showing SR with vss as charted.  Pt able to order dinner and delivered.  Mom visiting bedside.  IV in right arm intact.

## 2018-12-11 NOTE — DISCHARGE SUMMARY
"   Hospital Medicine Service -  Inpatient Discharge Summary        BRIEF OVERVIEW   Admitting Provider: Jessica Lema DO  Attending Provider: Kinza Landon MD Attending phys phone: [phone]    PCP: Madelaine Leahy [phone]    Admission Date: 12/10/2018  Discharge Date: 12/11/2018     DISCHARGE DIAGNOSES      Primary Discharge Diagnosis  Dyspnea    Secondary Discharge Diagnoses  Active Hospital Problems    Diagnosis Date Noted   • Dyspnea 12/10/2018   • CAD (coronary artery disease), native coronary artery 12/10/2018   • Benign hypertension 12/10/2018   • Hyperlipidemia 12/10/2018   • Seizure disorder (CMS/[facility]) ([facility]) 12/10/2018   • Schizoaffective disorder (CMS/[facility]) ([facility]) 12/10/2018      Resolved Hospital Problems    Diagnosis Date Noted Date Resolved   No resolved problems to display.       Problem List on Day of Discharge  Schizoaffective disorder (CMS/[facility]) ([facility])   Assessment & Plan    Stable.  -Cont. Clozapine and PRN Klonopin        Seizure disorder (CMS/[facility]) ([facility])   Assessment & Plan    Stable, no issues.  -Cont. Lamictal        Hyperlipidemia   Assessment & Plan     Continue Crestor.        Benign hypertension   Assessment & Plan    BP stable.  -Continue Amlodipine and Metoprolol succinate        CAD (coronary artery disease), native coronary artery   Assessment & Plan    As above.  -Continue Brilinta and ASA, Metoprolol, Crestor.          * Dyspnea   Assessment & Plan    Patient having vague symptoms of SOB and \"heavy wheezing\" occurring at rest but also with exertion, seems to be exacerbated by physical activity.  Also with associated chest tightness and some pleuritic chest pain as well.  -Despite two weeks of symptoms, troponin and EKG are non ischemic.  Lungs are clear on exam and CXR is unremarkable.    - troponin negative x 2  -Negative d-dimer  - stress echo completed 12/11, unable to reach THR .  Discussed with cardiology.  Echo portion showed NO RWMA.  He will need follow-up " "with Dr.Tarun Briseno as OP for continued follow up          SUMMARY OF HOSPITALIZATION      Presenting Problem/History of Present Illness  Chest pain    This is a 53 y.o. year-old male admitted on 12/10/2018 with Shortness of breath [R06.02]  Unstable angina (CMS/HCC) (HCC) [I20.0]  Chest pain [R07.9]  Dyspnea on exertion [R06.09].     Jhon Schroeder is a 53 y.o. male with a past medical history of CAD s/p stent to posterolateral branch of RCA in 8/17 (with patent stent on [facility] 9/17), HTN, HLD, seizures, schizoaffective disorder who presents with dyspnea and chest pain.  He states that for the past two weeks, he's noted dyspnea.  Feels like he is SOB to where he has to work to breathe.  He notes \"heavy wheezing\" with some of these episodes.  They occur with exertion (almost always when he walks up the steps) but also can occur while he's lying in bed at night.  He notices some chest pressure associated with the dyspnea on occasion.  Some chest \"constriction\" when taking a deep breath as well.  No orthopnea or PND.  No LE edema.  No cough or fever.  Had URI a few weeks ago.  He went to see his primary care physician today who told him his lungs sounded clear and advised him to come to the ER.  He has not missed doses of his DAPT and is compliant with his other medications.     Prior cardiologic testing:   Exercise nuclear stress 2014, did not achieve target HR, no ischemic findings.   [facility] 8/17:  RPL BRYCE placed   [facility] 9/17:  Patent RPL stent, possible microvascular disease with mild slow flow in RCA.   TTE 8/17:  EF 60-65% without RWMA.      Hospital Course    Troponin negative x 2, no ischemic changes noted on EKG. No events noted on telemetry. Stress Echo completed on 12/11 but unfortunately the patient was unable to reach target heart rate.  His baseline echo showed estimated EF of 65% with no RWMA. He should be evaluated for nuclear stress test as outpatient with his cardiologist.  This was discussed with the " patient and he is in agreement.  No medication changes have been made during this stay.  Vital signs stable, he is ready for discharge home.    Exam on Day of Discharge  Physical Exam   Constitutional: He is oriented to person, place, and time. He appears well-developed and well-nourished.   HENT:   Head: Normocephalic and atraumatic.   Mouth/Throat: Oropharynx is clear and moist.   Eyes: Conjunctivae and EOM are normal. Pupils are equal, round, and reactive to light.   Neck: Normal range of motion. Neck supple. No thyromegaly present.   Cardiovascular: Normal rate, regular rhythm and normal heart sounds.    Pulmonary/Chest: Effort normal and breath sounds normal. No respiratory distress.   Abdominal: Soft. Bowel sounds are normal. He exhibits no distension.   Musculoskeletal: Normal range of motion. He exhibits no edema.   Neurological: He is alert and oriented to person, place, and time.   Skin: Skin is warm and dry. No erythema.   Psychiatric: He has a normal mood and affect. His behavior is normal. Judgment and thought content normal.   Nursing note and vitals reviewed.      Consults During Admission  IP CONSULT TO CARDIOLOGY    DISCHARGE MEDICATIONS        Medication List      CONTINUE taking these medications    amLODIPine 2.5 mg tablet  Commonly known as:  NORVASC     aspirin 81 mg enteric coated tablet  Take 81 mg by mouth daily.     BRILINTA 90 mg tablet  Generic drug:  ticagrelor     clonazePAM 1 mg tablet  Commonly known as:  klonoPIN  TK 1 T PO BID PRN     clozapine 200 mg tablet  Commonly known as:  CLOZARIL  TK 1 T PO BID     lamoTRIgine 200 mg tablet  Commonly known as:  LaMICtal  TK 1 T PO BID     metoprolol succinate XL 25 mg 24 hr tablet  Commonly known as:  TOPROL-XL  Take 25 mg by mouth every evening.     omeprazole 20 mg capsule  Commonly known as:  PriLOSEC  Take 20 mg by mouth daily.     rosuvastatin 10 mg tablet  Commonly known as:  CRESTOR  Take 10 mg by mouth daily.                      PROCEDURES / LABS / IMAGING      Operative Procedures  none    Other Procedures  Stress Echo 12/11/18    Pertinent Labs    Results from last 7 days  Lab Units 12/10/18  1529   SODIUM mEQ/L 139   POTASSIUM mEQ/L 4.0   CHLORIDE mEQ/L 107   CO2 mEQ/L 24   BUN mg/dL 14   CREATININE mg/dL 0.8   GLUCOSE mg/dL 103*   CALCIUM mg/dL 8.9         Results from last 7 days  Lab Units 12/10/18  1529   WBC K/uL 6.84   HEMOGLOBIN g/dL 15.9   HEMATOCRIT % 47.9   PLATELETS K/uL 223         Results from last 7 days  Lab Units 12/10/18  2028 12/10/18  1529   TROPONIN I ng/mL <0.03 <0.03         Pertinent Imaging  X-ray Chest 2 Views    Result Date: 12/10/2018  IMPRESSION: No active disease in the chest. COMMENT: The current two view examination of the chest was compared to that dated 9/7/2017. The lungs are free of active infiltration and congestion.  No volume loss or pleural effusions are seen.  The cardiomediastinal silhouette is within normal limits of size and configuration. The bony thorax is intact.       OUTPATIENT  FOLLOW-UP / REFERRALS / PENDING TESTS        Outpatient Follow-Up Appointments  Encounter Information     You do not currently have any appointments scheduled.          Referrals  No orders of the defined types were placed in this encounter.          Important Issues to Address in Follow-Up  Cards OP f/u with Dr. Briseno for nuclear stress test     DISCHARGE DISPOSITION      Disposition: Home     Code Status At Discharge: Full Code    Physician Order for Life-Sustaining Treatment Document Status      No documents found

## 2018-12-11 NOTE — NURSING NOTE
Pt 229W, Elda, complains of SOB, jaw pain and neck pain. Pt does not appear to be SOB. What would you like to do. Sunshine PULIDO [phone] paged [phone]

## 2018-12-11 NOTE — DISCHARGE INSTRUCTIONS
Mr. Schroeder,     You were treated for your shortness of breath and chest discomfort.  You were evaluated by the cardiology team and a stress echocardiogram was completed on 12/11.  Unfortunately you run unable to reach the target heart rate.  The baseline echo did show an estimated ejection fraction of 65%.  We are recommending that you follow-up with Dr. Price, your  cardiologist, in order to discuss completing a nuclear stress test.  We also recommend that you follow-up with your primary care physician in 1 week.     As we discussed, he may want to consider making an appointment with a pulmonologist for further workup.  He can find the information on making an appointment below.    Pulmonology Office  [facility]  [address]  [address] [place] [address]  TEL: [phone]  FAX: [phone]      Thank you for choosing [facility]!  It has been a pleasure caring for you!

## 2018-12-11 NOTE — CONSULTS
Cardiology Consult Note    CC: SOB     Subjective     Jhon Schroeder is a 53 y.o. male who was admitted for SOB. He has a history of coronary artery disease status post PCI in August 2017.  He had a subtotal occlusion of the PLB.  He underwent repeat catheterization soon after because of recurrent symptoms and this revealed a patent stent with nonobstructive disease elsewhere.  He describes several weeks of dyspnea on exertion which occurs when he moves furniture or walks up a hill.  He does not really experience shortness of breath at rest.  He reports occasional chest pain that is sharp and short lived and not too bothersome.  He denies any orthopnea, PND, or lower extremity swelling.  Currently he feels well and is resting comfortably in bed.    Medical History:   Past Medical History:   Diagnosis Date   • CAD (coronary artery disease)    • Depression    • GERD (gastroesophageal reflux disease)    • Hypertension    • Lipid disorder    • Schizoaffective disorder (CMS/HCC) ([facility])    • Seizures (CMS/HCC) ([facility])        Surgical History: History reviewed. No pertinent surgical history.    Social History:   Social History     Social History Narrative    Lives at home alone in [place] [facility] [place].  Works as an .  No children.   Behavioral:  No tobacco use Quit 12/3/09.  1 ppd for 30 years.  Alcohol: Alcohol use: 1 drink per day.      Family History:   Family History   Problem Relation Age of Onset   • Heart disease Father        Allergies: No known allergies      Current Facility-Administered Medications:   •  acetaminophen (TYLENOL) tablet 650 mg, 650 mg, oral, q6h PRN, Carlton Lemaa R, DO, 650 mg at 12/10/18 2206  •  amLODIPine (NORVASC) tablet 2.5 mg, 2.5 mg, oral, Daily, Carlton Lemaa R, DO, 2.5 mg at 12/10/18 2206  •  aspirin enteric coated tablet 81 mg, 81 mg, oral, Daily, Carlton Lemaa R, DO, 81 mg at 12/10/18 2205  •  clonazePAM (klonoPIN) tablet 1 mg, 1 mg, oral, 2x daily PRN, Carlton Lemaa R,  DO  •  cloZAPine (CLOZARIL) tablet 200 mg, 200 mg, oral, BID, Torey, Jessiac R, DO, 200 mg at 12/10/18 2206  •  glucose chewable tablet 16-32 g of dextrose, 16-32 g of dextrose, oral, PRN **OR** dextrose 40 % oral gel 15-30 g of dextrose, 15-30 g of dextrose, oral, PRN **OR** glucagon (GLUCAGEN) injection 1 mg, 1 mg, intramuscular, PRN **OR** dextrose in water injection 12.5 g, 25 mL, intravenous, PRN, Torey, Jessica R, DO  •  lamoTRIgine (LaMICtal) tablet 200 mg, 200 mg, oral, BID, Weston Lemaantha R, DO, 200 mg at 12/10/18 2206  •  metoprolol succinate XL (TOPROL-XL) 24 hr ER tablet 25 mg, 25 mg, oral, q PM, Carlton Lemaa R, DO, 25 mg at 12/10/18 2205  •  pantoprazole (PROTONIX) tablet,delayed release (DR/EC) 40 mg, 40 mg, oral, Daily, Torey, Jessica R, DO, 40 mg at 12/10/18 2205  •  potassium chloride 20 mEq in 100 mL IVPB  (premix), 20 mEq, intravenous, PRN, Carlton Lemaa R, DO  •  potassium chloride 40 mEq in sodium chloride 0.9 % 250 mL IVPB, 40 mEq, intravenous, PRN, Torey, Jessica R, DO  •  rosuvastatin (CRESTOR) tablet 10 mg, 10 mg, oral, Daily, Carlton Lemaa R, DO, 10 mg at 12/10/18 2205  •  ticagrelor (BRILINTA) tablet 90 mg, 90 mg, oral, BID, Torey, Jessiac R, DO, 90 mg at 12/10/18 2206    •  acetaminophen, 650 mg, oral, q6h PRN  •  amLODIPine, 2.5 mg, oral, Daily  •  aspirin, 81 mg, oral, Daily  •  clonazePAM, 1 mg, oral, 2x daily PRN  •  clozapine, 200 mg, oral, BID  •  glucose, 16-32 g of dextrose, oral, PRN **OR** dextrose, 15-30 g of dextrose, oral, PRN **OR** glucagon, 1 mg, intramuscular, PRN **OR** dextrose in water, 25 mL, intravenous, PRN  •  lamoTRIgine, 200 mg, oral, BID  •  metoprolol succinate XL, 25 mg, oral, q PM  •  pantoprazole, 40 mg, oral, Daily  •  potassium chloride, 20 mEq, intravenous, PRN  •  potassium chloride, 40 mEq, intravenous, PRN  •  rosuvastatin, 10 mg, oral, Daily  •  ticagrelor, 90 mg, oral, BID    Review of Systems  All other systems reviewed and negative  "except as noted in the HPI.    Objective     Labs  I have reviewed the patient's labs.      BMP Results       12/10/18 09/07/17 09/01/17                    1529 0940 0514          140 143         K 4.0 4.1 3.8         Cl 107 106 106         CO2 24 25 28         Glucose 103 (H) 154 (H) 73         BUN 14 12 21 (H)         Creatinine 0.8 1.1 1.1         Calcium 8.9 9.2 9.1         Anion Gap 8 9 9         EGFR &gt;60.0 - -         Comment for K at 1529 on 12/10/18:    Results obtained on plasma. Plasma Potassium values may be up to 0.4 mEQ/L less than serum values. The differences may be greater for patients with high platelet or white cell counts.          CBC Results       12/10/18 09/07/17 09/01/17                    1529 0940 0514         WBC 6.84 7.43 8.23         RBC 5.51 5.36 5.07         HGB 15.9 15.8 15.0         HCT 47.9 47.4 44.9         MCV 86.9 88.4 88.6         MCH 28.9 29.5 29.6         MCHC 33.2 33.3 33.4          232 224                       Troponin I Results       12/10/18 12/10/18 09/07/17                    2028 1529 1356         Troponin I &lt;0.03 &lt;0.03 &lt;0.03  A rise or fall of troponin with at least one abnormal value is consistent with myocardial injury or necrosis in the presence of appropriate clinical, ECG, and/or imaging abnormalities.                           Imaging  I have independently reviewed the pertinent imaging from the last 24 hrs.  CXR: NAD    ECG   I have independently reviewed the patient's ECG results.    SR, old inf MI, no acute ST-T changes    Telemetry  I have independently reviewed the patient's telemetry.  SR    Physical Exam  /61 (BP Location: Left upper arm, Patient Position: Lying)   Pulse 71   Temp 36.6 °C (97.8 °F) (Oral)   Resp 16   Ht 1.651 m (5' 5\")   Wt 87.1 kg (192 lb)   SpO2 93%   BMI 31.95 kg/m²     General Appearance:    Alert, cooperative, no distress, appears stated age   Head:    Normocephalic, without obvious abnormality, " atraumatic   Eyes:    PERRL, conjunctiva/corneas clear, EOM's intact, fundi     benign, both eyes                    Neck:   No carotid    bruit or JVD       Lungs:     Clear to auscultation bilaterally, respirations unlabored   Chest wall:    No tenderness or deformity   Heart:    Regular rate and rhythm, S1 and S2 normal, no murmur, rub or gallop   Abdomen:     Soft, non-tender, bowel sounds active.           Extremities:  Musculoskeletal:   Extremities normal, atraumatic, no cyanosis or edema    No injury or deformity       Skin:   No rashes or lesions   Lymph nodes:   Cervical, supraclavicular, and axillary nodes normal   Neurologic:    Behavior/  Emotional:   CNII-XII intact.      Appropriate, cooperative       A/P:    1.  Shortness of breath.  This could represent an anginal equivalent though he ruled out for MI and his EKG is non-acute.  I set him up for a stress echocardiogram to be done today and obviously further management will depend on the results.    2.  Coronary artery disease status post PCI.  Continue aspirin, Brilinta, and statin.  His beta blocker should be held this morning for his upcoming stress test.

## 2022-04-20 ENCOUNTER — TELEPHONE (OUTPATIENT)
Dept: SCHEDULING | Facility: CLINIC | Age: 57
End: 2022-04-20
Payer: COMMERCIAL

## 2022-04-20 NOTE — TELEPHONE ENCOUNTER
New Patient Appointment Request    Name of caller: Jhon     Reason for Visit: BP issues     Insurance: [employer] [facility]    Insurance ID #: [id number]    Recent Procedures: N/A     Referred by: self     Previous Cardiologist name and phone number:Dr Dee Modi contact number: [phone]

## 2022-04-22 ENCOUNTER — TELEPHONE (OUTPATIENT)
Dept: CARDIOLOGY | Facility: CLINIC | Age: 57
End: 2022-04-22
Payer: COMMERCIAL

## 2022-05-26 ENCOUNTER — OFFICE VISIT (OUTPATIENT)
Dept: CARDIOLOGY | Facility: CLINIC | Age: 57
End: 2022-05-26
Payer: COMMERCIAL

## 2022-05-26 ENCOUNTER — TELEPHONE (OUTPATIENT)
Dept: CARDIOLOGY | Facility: CLINIC | Age: 57
End: 2022-05-26

## 2022-05-26 VITALS — HEART RATE: 74 BPM | WEIGHT: 178 LBS | HEIGHT: 65 IN | BODY MASS INDEX: 29.66 KG/M2

## 2022-05-26 DIAGNOSIS — I10 BENIGN HYPERTENSION: ICD-10-CM

## 2022-05-26 DIAGNOSIS — I25.10 CORONARY ARTERY DISEASE INVOLVING NATIVE CORONARY ARTERY OF NATIVE HEART WITHOUT ANGINA PECTORIS: Primary | ICD-10-CM

## 2022-05-26 DIAGNOSIS — R07.89 CHEST DISCOMFORT: ICD-10-CM

## 2022-05-26 DIAGNOSIS — E78.00 PURE HYPERCHOLESTEROLEMIA: ICD-10-CM

## 2022-05-26 DIAGNOSIS — I45.10 RIGHT BUNDLE BRANCH BLOCK: ICD-10-CM

## 2022-05-26 PROCEDURE — 3008F BODY MASS INDEX DOCD: CPT | Performed by: INTERNAL MEDICINE

## 2022-05-26 PROCEDURE — 99215 OFFICE O/P EST HI 40 MIN: CPT | Performed by: INTERNAL MEDICINE

## 2022-05-26 PROCEDURE — 93000 ELECTROCARDIOGRAM COMPLETE: CPT | Performed by: INTERNAL MEDICINE

## 2022-05-26 NOTE — LETTER
May 26, 2022     Madelaine Leahy MD  [address]  [address]  [place] [address]    Patient: Jhon Schroeder  YOB: 1965  Date of Visit: 5/26/2022      Dear Dr. Leahy:    Thank you for referring Jhon Schroeder to me for evaluation. Below are my notes for this consultation.    If you have questions, please do not hesitate to call me. I look forward to following your patient along with you.         Sincerely,        Frank Crum MD        CC: No Recipients  Frank Crum MD  5/26/2022  1:46 PM  Sign when Signing Visit    CARDIOLOGY OUTPATIENT NOTE      Subjective:  Jhon comes to be evaluated for chest discomfort.  He is a 57-year-old gentleman who says that for the last 1-1/2 years has felt heaviness over his head and generalized fatigue and dizziness but also a sensation of chest discomfort that is localized to the lower chest and does not sound like his usual reflux.  The sensation occurs 3 times per week and lasts on average an hour.  It always occurs at rest.  He bicycles 8-1/2 miles and can climb 2 flights of stairs without any angina dyspnea syncope near syncope palpitations.  He has a history of hypertension but no cerebrovascular disease.  He does not have vertigo.  He had in 2017 stent to the posterolateral branch of the RCA.  He has not seen a cardiologist since that time.  He is on multiple psychotropics and again has not seen a psychiatrist anytime recently      Allergies: No known allergies    Medications    Current Outpatient Medications:   •  amLODIPine (NORVASC) 2.5 mg tablet, , Disp: , Rfl:   •  aspirin 81 mg enteric coated tablet, Take 81 mg by mouth daily., Disp: , Rfl:   •  clonazePAM (klonoPIN) 1 mg tablet, TK 1 T PO BID PRN, Disp: , Rfl: 2  •  clozapine (CLOZARIL) 200 mg tablet, TK 1 T PO BID, Disp: , Rfl: 11  •  lamoTRIgine (LaMICtal) 200 mg tablet, TK 1 T PO BID, Disp: , Rfl: 0  •  metoprolol succinate XL (TOPROL-XL) 25 mg 24 hr tablet, Take 25 mg by  mouth every evening., Disp: , Rfl:   •  omeprazole (PriLOSEC) 20 mg capsule, Take 20 mg by mouth daily., Disp: , Rfl:   •  rosuvastatin (CRESTOR) 10 mg tablet, Take 10 mg by mouth daily., Disp: , Rfl:   •  ticagrelor (BRILINTA) 90 mg tablet, , Disp: , Rfl:       History  Medical History:   Past Medical History:   Diagnosis Date   • CAD (coronary artery disease)    • Depression    • GERD (gastroesophageal reflux disease)    • Hypertension    • Lipid disorder    • Schizoaffective disorder (CMS/HCC)    • Seizures (CMS/HCC)        Surgical History: No past surgical history on file.    Social History:   Social History     Socioeconomic History   • Marital status: Single   Tobacco Use   • Smoking status: Former Smoker     Packs/day: 0.50     Years: 30.00     Pack years: 15.00     Types: Cigarettes     Quit date: 12/3/2009     Years since quittin.4   • Smokeless tobacco: Never Used   Substance and Sexual Activity   • Alcohol use: Yes     Alcohol/week: 1.0 - 2.0 standard drink     Types: 1 - 2 Glasses of wine per week     Comment: Nightly   • Drug use: No   Social History Narrative    Lives at home alone in [facility].  Works as an .  No children.       Family History:   Family History   Problem Relation Age of Onset   • Heart disease Biological Father          Review of Systems   14 point review of systems completed and otherwise negative unless noted above in the HPI      OBJECTIVE  There were no vitals taken for this visit.  Weights (last 7 days)     None            Physical Exam   He looks to be in no acute distress.  At times he goes through tangents and it is very hard to extract a history from.  Weight is 178 pounds.  Blood pressure is 105/70 right arm sitting.  Pulse is 65 bpm  Normocephalic atraumatic.  Alert oriented x3.  No icterus.  No conjunctival pallor.  Normal jugular venous pressure.  Normal carotid upstroke without bruits.  Normal thyroid.  No adenopathy.  Excellent pedal pulses.    Quiet  precordium.  S1 is normal.  S2 is widely but physiologically split.  Grade 1/6 apical systolic murmur.    Lungs clear.  Abdomen soft no megalies no masses no abnormal pulsations.  No rashes breakdown or malignancy of the skin.  No clubbing cyanosis or edema.  Appropriate affect judgment and insight.  No sensorimotor deficits.  His discomfort      Labs  CBC Results       12/10/18 09/07/17 09/01/17     1529 0940 0514    WBC 6.84 7.43 8.23    RBC 5.51 5.36 5.07    HGB 15.9 15.8 15.0    HCT 47.9 47.4 44.9    MCV 86.9 88.4 88.6    MCH 28.9 29.5 29.6    MCHC 33.2 33.3 33.4     232 224        CMP Results       12/10/18 09/07/17 09/01/17     1529 0940 0514     140 143    K 4.0 4.1 3.8    Cl 107 106 106    CO2 24 25 28    Glucose 103 154 73    BUN 14 12 21    Creatinine 0.8 1.1 1.1    Calcium 8.9 9.2 9.1    Anion Gap 8 9 9    EGFR >60.0 -- --         Comment for K at 1529 on 12/10/18:   Results obtained on plasma. Plasma Potassium values may be up to 0.4 mEQ/L less than serum values. The differences may be greater for patients with high platelet or white cell counts.        PT PTT Results       09/07/17 09/01/17 08/31/17     0940 0514 2154    PT 12.7 -- --    INR 0.9 -- --    PTT 26 47 34         Comment for INR at 0940 on 09/07/17: INR HAS NO DEFINED SIGNIFICANCE WHEN PT IS WITHIN THE REFERENCE RANGE.    Comment for PTT at 0514 on 09/01/17: THE STANDARD THERAPEUTIC RANGE FOR HEPARIN IS 68  SECONDS.        Troponin I Results       12/10/18 12/10/18 09/07/17     2028 1529 1356    Troponin I <0.03 <0.03 <0.03  A rise or fall of troponin with at least one abnormal value is consistent with myocardial injury or necrosis in the presence of appropriate clinical, ECG, and/or imaging abnormalities.            Cardiology results  EKG:  Right bundle branch block    Cardiac Imaging    ECHOCARDIOGRAM STRESS TEST 12/11/2018    Interpretation Summary  · The patient reported dyspnea and non-exercise-limiting angina during  the stress test.    Conclusion  1. Stress echocardiogram does not meet criteria for myocardial ischemia at submaximal workload achieved (81% TMHR).  2. Stress ECG does not meet criteria for ischemia at submaximal workload.  3. Fair exercise tolerance. Exercise duration 6 minutes achieving 7 METS.  4. Prior to the study patient complained of 5/10, sharp, left-sided chest pain which did not significantly change in severity or quality during or post stress.    Baseline Echo  1. Normal left ventricular cavity size, normal wall thickness, and preserved ejection fraction. Estimated EF 65%. No regional wall motion abnormalities.  2. Tricuspid aortic valve. Aortic valve and root sclerosis. No aortic regurgitation.  3. Structurally normal mitral valve. No mitral regurgitation.  4. Normal-sized left atrium.  5. Normal right ventricular size with preserved systolic function. Normal sized right atrium.  6. Trace tricuspid regurgitation.  7. IVC is not visualized  8. Trace pulmonic regurgitation. Pulmonic valve not well visualized.  9. No pericardial effusion.    Post-Exercise Echo  All walls become hyperdynamic and ejection fraction improves at submaximal workload achieved (81% TMHR)          ASSESSMENT AND PLAN:    1.  Coronary artery disease.   CTA of the coronaries on 1/25/2017 showed minimal LAD stenosis otherwise patent coronary arteries.  Total coronary calcium score was 13.  Total calcium volume 10.  All the calcium was in the LAD.  It is reported that the LAD had 20% stenosis just proximal to the takeoff of D1.   Drug-eluting stent to subtotally occluded posterolateral branch of the right coronary artery  on 8/25/ 2017.  The remaining vessels had luminal irregularity.  LVEDP is 4-6.    Another catheterization took place a week later on 9/1/2017.  There was no significant coronary artery disease.  The stent in the posterolateral branch was patent.  There was slow flow in the RCA.  His discomfort in no way suggest  "ischemic heart disease but having been through stenting before I feel that a stress test would be worthwhile although it is unlikely that in 5 years he would have developed left main disease or multivessel disease whereby interventional therapy is indicated.  He has stopped his ticagrelor because he could not afford it.  He is currently on Plavix 75 mg p.o. daily.    2.  Hypertension.    3.  Dyslipidemia.  The appropriate dose of Crestor for ischemic heart disease is in the 20 to 40 mg range unless if there are issues that I am unaware of.    4.  Schizoaffective disorder.    5.  Gastroesophageal reflux disease.    6.  \"Dizziness\".  Very ill-defined.  Not vertigo.  Does not sound like CNS disease.  I do not think it is related to his heart.  His blood pressure is well controlled and he says it does not drop lower than that.  He is not orthostatic.  I told him that he is on 3 psychotropic medications and he is should be periodically reevaluated for need/dosing.  I do not see any reason for him to be on metoprolol which sometimes can slow people down as well as penetrate the CNS exacerbating depression etc. I will go ahead and stop the metoprolol.    We will reconnect after the stress test.        Frank Crum MD  5/26/2022  12:39 PM        "

## 2022-05-27 NOTE — TELEPHONE ENCOUNTER
Our pre-cert team does not handle any pre-certs scheduled within 5 days of the test being scheduled.     This should be handled by the front office.     Thank You!

## 2022-06-01 ENCOUNTER — TELEPHONE (OUTPATIENT)
Dept: CARDIOLOGY | Facility: HOSPITAL | Age: 57
End: 2022-06-01

## 2022-06-01 NOTE — TELEPHONE ENCOUNTER
Spoke with patient with reminder and instructions for stress echo scheduled for 6/3.  No questions at this time.

## 2023-01-06 ENCOUNTER — APPOINTMENT (RX ONLY)
Dept: URBAN - METROPOLITAN AREA CLINIC 28 | Facility: CLINIC | Age: 58
Setting detail: DERMATOLOGY
End: 2023-01-06

## 2023-01-06 DIAGNOSIS — L85.8 OTHER SPECIFIED EPIDERMAL THICKENING: ICD-10-CM

## 2023-01-06 DIAGNOSIS — L84 CORNS AND CALLOSITIES: ICD-10-CM

## 2023-01-06 DIAGNOSIS — L21.8 OTHER SEBORRHEIC DERMATITIS: ICD-10-CM | Status: INADEQUATELY CONTROLLED

## 2023-01-06 PROCEDURE — ? PRESCRIPTION MEDICATION MANAGEMENT

## 2023-01-06 PROCEDURE — 99204 OFFICE O/P NEW MOD 45 MIN: CPT

## 2023-01-06 PROCEDURE — ? PRESCRIPTION

## 2023-01-06 PROCEDURE — ? COUNSELING

## 2023-01-06 RX ORDER — AMMONIUM LACTATE 120 MG/G
CREAM TOPICAL QDAY
Qty: 385 | Refills: 3 | Status: ERX | COMMUNITY
Start: 2023-01-06

## 2023-01-06 RX ORDER — CLOBETASOL PROPIONATE 0.05 MG/G
GEL TOPICAL QDAY
Qty: 30 | Refills: 3 | Status: ERX | COMMUNITY
Start: 2023-01-06

## 2023-01-06 RX ORDER — KETOCONAZOLE 20 MG/ML
SHAMPOO, SUSPENSION TOPICAL QDAY
Qty: 120 | Refills: 3 | Status: ERX | COMMUNITY
Start: 2023-01-06

## 2023-01-06 RX ADMIN — CLOBETASOL PROPIONATE: 0.05 GEL TOPICAL at 00:00

## 2023-01-06 RX ADMIN — KETOCONAZOLE: 20 SHAMPOO, SUSPENSION TOPICAL at 00:00

## 2023-01-06 RX ADMIN — AMMONIUM LACTATE: 120 CREAM TOPICAL at 00:00

## 2023-01-06 ASSESSMENT — LOCATION ZONE DERM
LOCATION ZONE: FACE
LOCATION ZONE: FEET
LOCATION ZONE: EAR

## 2023-01-06 ASSESSMENT — LOCATION DETAILED DESCRIPTION DERM
LOCATION DETAILED: LEFT CRUS OF HELIX
LOCATION DETAILED: LEFT PLANTAR FOREFOOT OVERLYING 2ND METATARSAL
LOCATION DETAILED: RIGHT MEDIAL PLANTAR MIDFOOT
LOCATION DETAILED: LEFT MEDIAL PLANTAR MIDFOOT
LOCATION DETAILED: LEFT CHIN

## 2023-01-06 ASSESSMENT — LOCATION SIMPLE DESCRIPTION DERM
LOCATION SIMPLE: LEFT PLANTAR SURFACE
LOCATION SIMPLE: LEFT EAR
LOCATION SIMPLE: RIGHT PLANTAR SURFACE
LOCATION SIMPLE: CHIN

## 2023-01-10 ENCOUNTER — RX ONLY (OUTPATIENT)
Age: 58
Setting detail: RX ONLY
End: 2023-01-10

## 2023-01-10 RX ORDER — FLUOCINONIDE GEL 0.5 MG/G
1 GEL TOPICAL QDAY
Qty: 30 | Refills: 2 | Status: ERX | COMMUNITY
Start: 2023-01-10

## 2023-08-11 ENCOUNTER — HOSPITAL ENCOUNTER (EMERGENCY)
Facility: HOSPITAL | Age: 58
Discharge: HOME | End: 2023-08-11
Attending: EMERGENCY MEDICINE
Payer: COMMERCIAL

## 2023-08-11 ENCOUNTER — APPOINTMENT (EMERGENCY)
Dept: RADIOLOGY | Facility: HOSPITAL | Age: 58
End: 2023-08-11
Payer: COMMERCIAL

## 2023-08-11 VITALS
HEART RATE: 74 BPM | OXYGEN SATURATION: 95 % | HEIGHT: 65 IN | BODY MASS INDEX: 29.99 KG/M2 | DIASTOLIC BLOOD PRESSURE: 81 MMHG | SYSTOLIC BLOOD PRESSURE: 132 MMHG | RESPIRATION RATE: 18 BRPM | TEMPERATURE: 97.9 F | WEIGHT: 180 LBS

## 2023-08-11 DIAGNOSIS — R42 DIZZINESS: ICD-10-CM

## 2023-08-11 DIAGNOSIS — R07.9 NONSPECIFIC CHEST PAIN: Primary | ICD-10-CM

## 2023-08-11 LAB
ALBUMIN SERPL-MCNC: 4.7 G/DL (ref 3.5–5.7)
ALP SERPL-CCNC: 78 IU/L (ref 34–125)
ALT SERPL-CCNC: 37 IU/L (ref 7–52)
ANION GAP SERPL CALC-SCNC: 12 MEQ/L (ref 3–15)
AST SERPL-CCNC: 33 IU/L (ref 13–39)
BASOPHILS # BLD: 0.02 K/UL (ref 0.01–0.1)
BASOPHILS NFR BLD: 0.3 %
BILIRUB SERPL-MCNC: 0.5 MG/DL (ref 0.3–1.2)
BUN SERPL-MCNC: 13 MG/DL (ref 7–25)
CALCIUM SERPL-MCNC: 9.3 MG/DL (ref 8.6–10.3)
CHLORIDE SERPL-SCNC: 104 MEQ/L (ref 98–107)
CO2 SERPL-SCNC: 25 MEQ/L (ref 21–31)
CREAT SERPL-MCNC: 1 MG/DL (ref 0.7–1.3)
DIFFERENTIAL METHOD BLD: ABNORMAL
EOSINOPHIL # BLD: 0.1 K/UL (ref 0.04–0.54)
EOSINOPHIL NFR BLD: 1.7 %
ERYTHROCYTE [DISTWIDTH] IN BLOOD BY AUTOMATED COUNT: 12.8 % (ref 11.6–14.4)
GFR SERPL CREATININE-BSD FRML MDRD: >60 ML/MIN/1.73M*2
GLUCOSE BLD-MCNC: 91 MG/DL (ref 70–99)
GLUCOSE SERPL-MCNC: 96 MG/DL (ref 70–99)
HCT VFR BLDCO AUTO: 48.3 % (ref 40.1–51)
HGB BLD-MCNC: 15.8 G/DL (ref 13.7–17.5)
IMM GRANULOCYTES # BLD AUTO: 0.01 K/UL (ref 0–0.08)
IMM GRANULOCYTES NFR BLD AUTO: 0.2 %
LYMPHOCYTES # BLD: 2.42 K/UL (ref 1.2–3.5)
LYMPHOCYTES NFR BLD: 40.4 %
MAGNESIUM SERPL-MCNC: 2.1 MG/DL (ref 1.8–2.5)
MCH RBC QN AUTO: 30.2 PG (ref 28–33.2)
MCHC RBC AUTO-ENTMCNC: 32.7 G/DL (ref 32.2–36.5)
MCV RBC AUTO: 92.4 FL (ref 83–98)
MONOCYTES # BLD: 0.41 K/UL (ref 0.3–1)
MONOCYTES NFR BLD: 6.8 %
NEUTROPHILS # BLD: 3.03 K/UL (ref 1.7–7)
NEUTS SEG NFR BLD: 50.6 %
NRBC BLD-RTO: 0 %
PDW BLD AUTO: 9.3 FL (ref 9.4–12.4)
PLATELET # BLD AUTO: 211 K/UL (ref 150–350)
POCT TEST: NORMAL
POTASSIUM SERPL-SCNC: 4.4 MEQ/L (ref 3.5–5.1)
PROT SERPL-MCNC: 6.8 G/DL (ref 6–8.2)
RBC # BLD AUTO: 5.23 M/UL (ref 4.5–5.8)
SODIUM SERPL-SCNC: 141 MEQ/L (ref 136–145)
TROPONIN I SERPL HS-MCNC: 4.6 PG/ML
TROPONIN I SERPL HS-MCNC: 5.5 PG/ML
WBC # BLD AUTO: 5.99 K/UL (ref 3.8–10.5)

## 2023-08-11 PROCEDURE — 99284 EMERGENCY DEPT VISIT MOD MDM: CPT | Mod: 25

## 2023-08-11 PROCEDURE — 85025 COMPLETE CBC W/AUTO DIFF WBC: CPT | Performed by: EMERGENCY MEDICINE

## 2023-08-11 PROCEDURE — 84484 ASSAY OF TROPONIN QUANT: CPT

## 2023-08-11 PROCEDURE — 93005 ELECTROCARDIOGRAM TRACING: CPT | Performed by: EMERGENCY MEDICINE

## 2023-08-11 PROCEDURE — 84484 ASSAY OF TROPONIN QUANT: CPT | Mod: 91 | Performed by: EMERGENCY MEDICINE

## 2023-08-11 PROCEDURE — 93005 ELECTROCARDIOGRAM TRACING: CPT

## 2023-08-11 PROCEDURE — 85025 COMPLETE CBC W/AUTO DIFF WBC: CPT

## 2023-08-11 PROCEDURE — 84484 ASSAY OF TROPONIN QUANT: CPT | Mod: 91

## 2023-08-11 PROCEDURE — 80053 COMPREHEN METABOLIC PANEL: CPT

## 2023-08-11 PROCEDURE — 71045 X-RAY EXAM CHEST 1 VIEW: CPT

## 2023-08-11 PROCEDURE — 83735 ASSAY OF MAGNESIUM: CPT

## 2023-08-11 PROCEDURE — 83735 ASSAY OF MAGNESIUM: CPT | Performed by: EMERGENCY MEDICINE

## 2023-08-11 PROCEDURE — G1004 CDSM NDSC: HCPCS

## 2023-08-11 PROCEDURE — 36415 COLL VENOUS BLD VENIPUNCTURE: CPT

## 2023-08-11 PROCEDURE — 80053 COMPREHEN METABOLIC PANEL: CPT | Performed by: EMERGENCY MEDICINE

## 2023-08-11 ASSESSMENT — ENCOUNTER SYMPTOMS
DIAPHORESIS: 0
DYSURIA: 0
SHORTNESS OF BREATH: 1
FEVER: 0
HEMATURIA: 0
NAUSEA: 0
VOMITING: 0
COLOR CHANGE: 0
DIZZINESS: 0
CHILLS: 0
SPEECH DIFFICULTY: 1
HEADACHES: 0
LIGHT-HEADEDNESS: 0
BLOOD IN STOOL: 0
FREQUENCY: 0
NUMBNESS: 0
ABDOMINAL PAIN: 0
DIARRHEA: 0
TREMORS: 1

## 2023-08-11 NOTE — ED PROVIDER NOTES
"Emergency Medicine Note  HPI   HISTORY OF PRESENT ILLNESS     58-year-old male with PMH of CAD, HTN, HL, schizoaffective disorder, and seizures presents to the emergency department for chest pain starting last night and off-balance feeling starting today.  Patient states that since last night he has had intermittent left-sided chest pain that he cannot describe.  Patient cannot state if anything makes it better or worse including but not limited to movement, exertion, or deep breaths.  Patient states he has had intermittent shortness of breath for the last 6 months denies worsening.  Patient denies fevers, chills, or diaphoresis.  Patient denies any new or worsening headaches, visual changes, lightheadedness, or dizziness.  Patient states that when he woke up this morning he was very shaky and felt like his equilibrium was off when he was walking.  Patient denies numbness or tingling.  Patient denies falling to the ground.  Patient states he felt like his speech was slurred as well.  Patient denies any abdominal pain, nausea, vomiting, change in urination, or change in bowel movements.  Patient states since arriving to the hospital his symptoms have improved.    Patient is a very poor historian.  For example when asking the patient if the chest pain is constant or intermittent he states \"the answer is not linear\".      History provided by:  Patient   used: No    Weakness - Generalized  Associated symptoms: chest pain and shortness of breath    Associated symptoms: no abdominal pain, no diarrhea, no dizziness, no dysuria, no fever, no frequency, no headaches, no nausea and no vomiting          Patient History   PAST HISTORY     Reviewed from Nursing Triage:       Past Medical History:   Diagnosis Date   • CAD (coronary artery disease)    • Depression    • GERD (gastroesophageal reflux disease)    • Hypertension    • Lipid disorder    • Schizoaffective disorder (CMS/[facility])    • Seizures (CMS/[facility])  "       No past surgical history on file.    Family History   Problem Relation Age of Onset   • Heart disease Biological Father        Social History     Tobacco Use   • Smoking status: Former     Packs/day: 0.50     Years: 30.00     Pack years: 15.00     Types: Cigarettes     Quit date: 12/3/2009     Years since quittin.6   • Smokeless tobacco: Never   Vaping Use   • Vaping Use: Never used   Substance Use Topics   • Alcohol use: Yes     Alcohol/week: 1.0 - 2.0 standard drink of alcohol     Types: 1 - 2 Glasses of wine per week     Comment: Nightly   • Drug use: No         Review of Systems   REVIEW OF SYSTEMS     Review of Systems   Constitutional: Negative for chills, diaphoresis and fever.   Eyes: Negative for visual disturbance.   Respiratory: Positive for shortness of breath.    Cardiovascular: Positive for chest pain.   Gastrointestinal: Negative for abdominal pain, blood in stool, diarrhea, nausea and vomiting.   Genitourinary: Negative for dysuria, frequency and hematuria.   Musculoskeletal: Positive for gait problem.   Skin: Negative for color change and rash.   Neurological: Positive for tremors and speech difficulty. Negative for dizziness, syncope, light-headedness, numbness and headaches.   All other systems reviewed and are negative.        VITALS     ED Vitals    Date/Time Temp Pulse Resp BP SpO2 [facility]   23 -- 73 17 118/63 96 % MB   23 1653 36.6 °C (97.9 °F) 84 18 138/81 94 % KLM        Pulse Ox %: 94 % (23)  Pulse Ox Interpretation: Normal (23)  Heart Rate: 84 (23)  Rhythm Strip Interpretation: Normal Sinus Rhythm (23)     Physical Exam   PHYSICAL EXAM     Physical Exam  Vitals and nursing note reviewed.   Constitutional:       General: He is not in acute distress.     Appearance: Normal appearance. He is well-developed. He is not ill-appearing or diaphoretic.   HENT:      Head: Normocephalic and atraumatic.   Eyes:      Extraocular  Movements: Extraocular movements intact.      Conjunctiva/sclera: Conjunctivae normal.      Pupils: Pupils are equal, round, and reactive to light.   Cardiovascular:      Rate and Rhythm: Normal rate and regular rhythm.      Heart sounds: Normal heart sounds.   Pulmonary:      Effort: Pulmonary effort is normal. No respiratory distress.      Breath sounds: Normal breath sounds. No wheezing or rales.   Abdominal:      General: Abdomen is flat. Bowel sounds are normal. There is no distension.      Palpations: Abdomen is soft.      Tenderness: There is no abdominal tenderness. There is no guarding or rebound.   Musculoskeletal:         General: Normal range of motion.      Cervical back: Normal range of motion.   Skin:     General: Skin is warm and dry.      Capillary Refill: Capillary refill takes less than 2 seconds.   Neurological:      General: No focal deficit present.      Mental Status: He is alert and oriented to person, place, and time.      Cranial Nerves: No cranial nerve deficit.      Motor: No weakness.      Coordination: Coordination normal.      Gait: Gait normal.      Comments: Cranial nerves II through XII grossly intact.  Finger-to-nose intact.  No pronator drift in upper or lower extremities.  Speech grossly normal.    Strength is 5/5 in the upper extremities bilaterally with  strength, shoulder flexion, shoulder extension, shoulder abduction, and shoulder adduction.  Strength is 5/5 in lower extremities bilaterally with plantar and dorsiflexion.  Romberg is negative.  Patient ambulating with steady gait independently.   Psychiatric:      Comments: Strange affect           PROCEDURES     Procedures     DATA     Results     Procedure Component Value Units Date/Time    Magnesium [[id number]]  (Normal) Collected: 08/11/23 1803    Specimen: Blood, Venous Updated: 08/11/23 1926     Magnesium 2.1 mg/dL     Comprehensive metabolic panel [[id number]]  (Normal) Collected: 08/11/23 1803    Specimen: Blood,  Venous Updated: 08/11/23 1925     Sodium 141 mEQ/L      Potassium 4.4 mEQ/L      Chloride 104 mEQ/L      CO2 25 mEQ/L      BUN 13 mg/dL      Creatinine 1.0 mg/dL      Glucose 96 mg/dL      Calcium 9.3 mg/dL      AST (SGOT) 33 IU/L      ALT (SGPT) 37 IU/L      Alkaline Phosphatase 78 IU/L      Total Protein 6.8 g/dL      Albumin 4.7 g/dL      Bilirubin, Total 0.5 mg/dL      eGFR >60.0 mL/min/1.73m*2      Anion Gap 12 mEQ/L     HS Troponin I (with 2 hour reflex) [[id number]]  (Normal) Collected: 08/11/23 1803    Specimen: Blood, Venous Updated: 08/11/23 1901     High Sens Troponin I 5.5 pg/mL     CBC and differential [[id number]]  (Abnormal) Collected: 08/11/23 1803    Specimen: Blood, Venous Updated: 08/11/23 1831     WBC 5.99 K/uL      RBC 5.23 M/uL      Hemoglobin 15.8 g/dL      Hematocrit 48.3 %      MCV 92.4 fL      MCH 30.2 pg      MCHC 32.7 g/dL      RDW 12.8 %      Platelets 211 K/uL      MPV 9.3 fL      Differential Type Auto     nRBC 0.0 %      Immature Granulocytes 0.2 %      Neutrophils 50.6 %      Lymphocytes 40.4 %      Monocytes 6.8 %      Eosinophils 1.7 %      Basophils 0.3 %      Immature Granulocytes, Absolute 0.01 K/uL      Neutrophils, Absolute 3.03 K/uL      Lymphocytes, Absolute 2.42 K/uL      Monocytes, Absolute 0.41 K/uL      Eosinophils, Absolute 0.10 K/uL      Basophils, Absolute 0.02 K/uL           Imaging Results          X-RAY CHEST 1 VIEW (In process)                CT HEAD WITHOUT IV CONTRAST (Final result)  Result time 08/11/23 17:20:00    Final result                 Impression:    IMPRESSION: No definite acute intracranial abnormality. Further evaluation with  brain MRI may be considered as indicated.             Narrative:    CLINICAL HISTORY: Slurred speech.    COMMENT: Unenhanced CT of the head was performed with sagittal and coronal  reconstructions. Comparison is made with brain MRI from 1/15/2010.    CT DOSE:  One or more dose reduction techniques (e.g. automated exposure  control,  adjustment of the mA and/or kV according to patient size, use of  iterative reconstruction technique) utilized for this examination.    There is no evidence of an acute intracranial hemorrhage, acute territorial  infarct, mass effect, or hydrocephalus. The calvarium appears intact. The  visualized paranasal sinuses and mastoid air cells are grossly clear.                                ECG 12 lead   Independent Interpretation by ED Provider   NSR at 82 bpm.  Left axis deviation.  RBBB.  No STEMI.  QTc is 488.  Compared to EKG from 5/26/2022 no significant change appreciated.          Scoring tools                                  ED Course & MDM   MDM / ED COURSE / CLINICAL IMPRESSION / DISPO     Medical Decision Making  58-year-old male with PMH of CAD, HTN, HL, schizoaffective disorder, and seizures presents to the emergency department for chest pain starting last night and off-balance feeling starting today.  Differential diagnosis is broad.  Will get basic labs, troponin, EKG, CXR, and CT of head for further evaluation.    This note was dictated using Dragon.  Please allow for any dictation errors.      Dizziness: complicated acute illness or injury  Nonspecific chest pain: complicated acute illness or injury  Amount and/or Complexity of Data Reviewed  Labs: ordered. Decision-making details documented in ED Course.  Radiology: ordered and independent interpretation performed. Decision-making details documented in ED Course.  ECG/medicine tests: ordered and independent interpretation performed. Decision-making details documented in ED Course.          ED Course as of 08/11/23 2236   Fri Aug 11, 2023   1906 CT of head does not reveal any acute intracranial pathology. [KM]   2059 Repeat troponin is negative.  Doubt ACS or MI.  CBC, CMP, magnesium levels are all stable.  CXR does not reveal any acute cardiopulmonary pathology.  Will discuss case with attending. [KM]   2235 Attending evaluated the patient.  Doubt CVA  or tPA at this time.  Patient continues to walk out of his room and around the emergency department without any difficulty.  He is requesting discharge home.  Will discharge patient with recommend for close follow-up with PCP in the next 2 days.  Signs and symptoms look out for were discussed with the patient that would indicate return the emergency department. Vital signs are stable, pt discharged home. Pt given strict precautions to return to the ED. Pt to follow-up with PCP in 2 days as well as any other providers as indicated.  [KM]      ED Course User Index  [KM] Annette Bland PA C     Clinical Impression      None               Annette Bland PA C  08/11/23 [id number]

## 2023-08-12 LAB
ATRIAL RATE: 82
P AXIS: 19
PR INTERVAL: 138
QRS DURATION: 134
QT INTERVAL: 418
QTC CALCULATION(BAZETT): 488
R AXIS: -9
T WAVE AXIS: -7
VENTRICULAR RATE: 82

## 2023-08-12 PROCEDURE — 93010 ELECTROCARDIOGRAM REPORT: CPT | Performed by: INTERNAL MEDICINE

## 2023-08-12 NOTE — DISCHARGE INSTRUCTIONS
Please follow up as directed to obtain any final results and review your plan of care. CALL your primary doctor's office today to schedule a follow up appointment within the next 48 hours.       REVIEW AND DISCUSS ALL OF YOUR MEDICATIONS WITH YOUR PRIMARY CARE TEAM WITHIN THE NEXT 2 DAYS, even ones that you may have been on for a long time.      Radiology review of your studies (XRAYs, CT Scans, Ultrasounds, MRI scans) may still be pending. Preliminary results may be available today but final written reports may not be available until tomorrow. Important findings may be included in the final radiology review.       If instructed please CALL the Emergency Department at [phone] to obtain the final results within the next 24 hours. Review the final results of all of your tests with your primary care doctor within the next 2 days.      Cultures and uncommon labs may take 2 days or more before results are available. Please ask about all pending tests until the final results are known. You may not be notified of important test results unless you ask for these when you call or when you follow up.      **PLEASE RETURN TO THE EMERGENCY DEPARTMENT IF YOU DEVELOP ANY NEW OR WORSENING SYMPTOMS**

## 2023-08-12 NOTE — ED ATTESTATION NOTE
I have personally seen and examined Jhon Schroeder. I personally performed the key components of the encounter and provided a substantive portion of the care and medical decision making for this patient.    I reviewed and agree with physician assistant / nurse practitioner’s assessment and plan of care, with any exceptions as   documented below.     My focused history, examination, assessment, and plan of care of Jhon Schroeder is at follows:     Brief History: HPI  Patient presented to the emergency department for evaluation of multiple complaints.  Stating that he had trouble walking yesterday, however has had this happen previously.  States no symptoms currently.  Additionally stated chest discomfort, however no symptoms currently.  Underlying history of hypertension hyperlipidemia schizoaffective disorder  Medical History includes:   Past Medical History:   Diagnosis Date   • CAD (coronary artery disease)    • Depression    • GERD (gastroesophageal reflux disease)    • Hypertension    • Lipid disorder    • Schizoaffective disorder (CMS/HCC)    • Seizures (CMS/HCC)         Focused Physical Exam:      Physical Exam  Vitals and nursing note reviewed.   Constitutional:       Appearance: He is well-developed.   HENT:      Head: Normocephalic and atraumatic.   Cardiovascular:      Rate and Rhythm: Normal rate and regular rhythm.   Pulmonary:      Effort: Pulmonary effort is normal.   Abdominal:      General: There is no distension.   Musculoskeletal:         General: Normal range of motion.      Cervical back: Neck supple.   Skin:     General: Skin is warm and dry.   Neurological:      Mental Status: He is alert and oriented to person, place, and time.           Assessment, and plan of care of Jhon Schroeder is as follows:     Assessment / Plan / MDM:   Medical Decision Making  Patient appears well.  Labs reviewed, ct imaging unremarkable      Dizziness: acute illness or injury  Nonspecific chest pain: acute  illness or injury  Amount and/or Complexity of Data Reviewed  Labs: ordered.  Radiology: ordered.  ECG/medicine tests: ordered and independent interpretation performed.          I was physically present for the key/critical portions of the following procedures: Procedures    Labs and Radiology Studies:   Labs Reviewed   CBC AND DIFF - Abnormal       Result Value    WBC 5.99      RBC 5.23      Hemoglobin 15.8      Hematocrit 48.3      MCV 92.4      MCH 30.2      MCHC 32.7      RDW 12.8      Platelets 211      MPV 9.3 (*)     Differential Type Auto      nRBC 0.0      Immature Granulocytes 0.2      Neutrophils 50.6      Lymphocytes 40.4      Monocytes 6.8      Eosinophils 1.7      Basophils 0.3      Immature Granulocytes, Absolute 0.01      Neutrophils, Absolute 3.03      Lymphocytes, Absolute 2.42      Monocytes, Absolute 0.41      Eosinophils, Absolute 0.10      Basophils, Absolute 0.02     COMPREHENSIVE METABOLIC PANEL - Normal    Sodium 141      Potassium 4.4      Chloride 104      CO2 25      BUN 13      Creatinine 1.0      Glucose 96      Calcium 9.3      AST (SGOT) 33      ALT (SGPT) 37      Alkaline Phosphatase 78      Total Protein 6.8      Albumin 4.7      Bilirubin, Total 0.5      eGFR >60.0      Anion Gap 12     HIGH SENSITIVE TROPONIN I (BASELINE - REFLEX 2HR) - Normal    High Sens Troponin I 5.5     MAGNESIUM - Normal    Magnesium 2.1     HIGH SENSITIVE TROPONIN I (NO REFLEX) - Normal    High Sens Troponin I 4.6     POCT GLUCOSE   POCT GLUCOSE (BEAKER)    POCT Bedside Glucose 91      POC Test POC       X-RAY CHEST 1 VIEW   Final Result   IMPRESSION: Grossly clear lungs.      CT HEAD WITHOUT IV CONTRAST   Final Result   IMPRESSION: No definite acute intracranial abnormality. Further evaluation with   brain MRI may be considered as indicated.      ECG 12 lead   ED Interpretation   NSR at 82 bpm.  Left axis deviation.  RBBB.  No STEMI.  QTc is 488.  Compared to EKG from 5/26/2022 no significant change  appreciated.               German Wang,   08/11/23 [id number]

## 2023-12-29 NOTE — PROCEDURE: PRESCRIPTION MEDICATION MANAGEMENT
Render In Strict Bullet Format?: No
Detail Level: Zone
Initiate Treatment: clobetasol 0.05 % topical gel: Apply a thin layer to ears QDAY until clear and then only use as needed\\nketoconazole 2 % shampoo: Lather up and massage onto beard area QDAY, let sit for 3-5 minutes then rinse
Initiate Treatment: ammonium lactate 12 % topical cream: Apply a thin layer to AA of feet QDAY
Improved